# Patient Record
Sex: FEMALE | Race: WHITE
[De-identification: names, ages, dates, MRNs, and addresses within clinical notes are randomized per-mention and may not be internally consistent; named-entity substitution may affect disease eponyms.]

---

## 2024-02-22 ENCOUNTER — APPOINTMENT (OUTPATIENT)
Dept: ORTHOPEDIC SURGERY | Facility: CLINIC | Age: 64
End: 2024-02-22
Payer: OTHER MISCELLANEOUS

## 2024-02-22 VITALS — BODY MASS INDEX: 26.68 KG/M2 | WEIGHT: 170 LBS | HEIGHT: 67 IN

## 2024-02-22 DIAGNOSIS — Z78.9 OTHER SPECIFIED HEALTH STATUS: ICD-10-CM

## 2024-02-22 DIAGNOSIS — S70.01XA CONTUSION OF RIGHT HIP, INITIAL ENCOUNTER: ICD-10-CM

## 2024-02-22 DIAGNOSIS — M81.0 AGE-RELATED OSTEOPOROSIS W/OUT CURRENT PATHOLOGICAL FRACTURE: ICD-10-CM

## 2024-02-22 PROBLEM — Z00.00 ENCOUNTER FOR PREVENTIVE HEALTH EXAMINATION: Status: ACTIVE | Noted: 2024-02-22

## 2024-02-22 PROCEDURE — 99203 OFFICE O/P NEW LOW 30 MIN: CPT

## 2024-02-22 PROCEDURE — 73503 X-RAY EXAM HIP UNI 4/> VIEWS: CPT | Mod: RT

## 2024-02-22 NOTE — PHYSICAL EXAM
[2+] : dorsalis pedis pulse: 2+ [] : light touch intact throughout [Right] : right hip with pelvis [Moderate arthritis (Tonnis Grade 2)] : Moderate arthritis (Tonnis Grade 2)

## 2024-02-22 NOTE — HISTORY OF PRESENT ILLNESS
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 States she fell onto her left side. She has tried ice, heat, Advil, Mobic, and Tylenol with little relief. States she had a rash from the Mobic. Notes pain is worse during the day, but can bother her with sleeping at night.  [] : no [FreeTextEntry1] : RT Hip [FreeTextEntry5] : Trip and fall at work

## 2024-02-22 NOTE — ASSESSMENT
[FreeTextEntry1] : 63F p/w R hip contusion, troch bursitis, OA  f/u MRI R hip, r/o occult fx Continue NSAIDs as needed for pain return after imaging  The patient was advised of the diagnosis.  The natural history of the pathology was explained in full to the patient in layman's terms. All questions were answered.  The risks and benefits of surgical and non-surgical treatment alternatives were explained in full to the patient.

## 2024-02-26 ENCOUNTER — APPOINTMENT (OUTPATIENT)
Dept: MRI IMAGING | Facility: CLINIC | Age: 64
End: 2024-02-26
Payer: OTHER MISCELLANEOUS

## 2024-02-26 PROCEDURE — 73721 MRI JNT OF LWR EXTRE W/O DYE: CPT | Mod: LT

## 2024-03-07 ENCOUNTER — APPOINTMENT (OUTPATIENT)
Dept: ORTHOPEDIC SURGERY | Facility: CLINIC | Age: 64
End: 2024-03-07
Payer: OTHER MISCELLANEOUS

## 2024-03-07 VITALS — BODY MASS INDEX: 26.68 KG/M2 | WEIGHT: 170 LBS | HEIGHT: 67 IN

## 2024-03-07 PROCEDURE — 99214 OFFICE O/P EST MOD 30 MIN: CPT

## 2024-03-07 NOTE — HISTORY OF PRESENT ILLNESS
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 States she fell onto her left side. She has tried ice, heat, Advil, Mobic, and Tylenol with little relief. States she had a rash from the Mobic. Notes pain is worse during the day, but can bother her with sleeping at night.   3/7/24 f/u R hip MRI, symptoms unchanged [de-identified] : no treatment at this time

## 2024-03-07 NOTE — REVIEW OF SYSTEMS
[Joint Pain] : joint pain [Joint Stiffness] : joint stiffness [FreeTextEntry9] : rt hip/buttock/groin

## 2024-03-07 NOTE — PHYSICAL EXAM
[2+] : dorsalis pedis pulse: 2+ [Right] : right hip with pelvis [] : light touch intact throughout [Moderate arthritis (Tonnis Grade 2)] : Moderate arthritis (Tonnis Grade 2)

## 2024-03-07 NOTE — ASSESSMENT
[FreeTextEntry1] : 63F p/w R hamstring and glut tear, OA  MRI reviewed, hamstring and gluteal tear, OA Continue NSAIDs as needed for pain return 6 wks  The patient was advised of the diagnosis.  The natural history of the pathology was explained in full to the patient in layman's terms. All questions were answered.  The risks and benefits of surgical and non-surgical treatment alternatives were explained in full to the patient.

## 2024-03-07 NOTE — WORK
[Torn Ligament/Tendon/Muscle] : torn ligament, tendon or muscle [Consistent with my objective findings] : consistent with my objective findings [Are consistent with the injury] : are consistent with the injury [Total (100%)] : total (100%)

## 2024-05-09 ENCOUNTER — APPOINTMENT (OUTPATIENT)
Dept: ORTHOPEDIC SURGERY | Facility: CLINIC | Age: 64
End: 2024-05-09
Payer: OTHER MISCELLANEOUS

## 2024-05-09 VITALS — WEIGHT: 170 LBS | HEIGHT: 67 IN | BODY MASS INDEX: 26.68 KG/M2

## 2024-05-09 DIAGNOSIS — S76.311A STRAIN OF MUSCLE, FASCIA AND TENDON OF THE POSTERIOR MUSCLE GROUP AT THIGH LEVEL, RIGHT THIGH, INITIAL ENCOUNTER: ICD-10-CM

## 2024-05-09 DIAGNOSIS — M67.951 UNSPECIFIED DISORDER OF SYNOVIUM AND TENDON, RIGHT THIGH: ICD-10-CM

## 2024-05-09 DIAGNOSIS — M16.11 UNILATERAL PRIMARY OSTEOARTHRITIS, RIGHT HIP: ICD-10-CM

## 2024-05-09 PROCEDURE — 99214 OFFICE O/P EST MOD 30 MIN: CPT

## 2024-05-09 NOTE — DISCUSSION/SUMMARY
[de-identified] : The patient's current medication management of their orthopedic diagnosis was reviewed today:   (1) We discussed a comprehensive treatment plan that included possible pharmaceutical management involving the use of prescription strength medications including but not limited to options such as oral Naprosyn 500mg BID, once daily Meloxicam 15 mg, or 500-650 mg Tylenol versus over the counter oral medications and topical prescription NSAID Pennsaid vs over the counter Voltaren gel.   (2) There is a moderate risk of morbidity with further treatment, especially from use of prescription strength medications and possible side effects of these medications which include upset stomach with oral medications, skin reactions to topical medications and cardiac/renal issues with long term use.   (3) I recommended that the patient follow-up with their medical physician to discuss any significant specific potential issues with long term medication use such as interactions with current medications or with exacerbation of underlying medical comorbidities.   (4) The benefits and risks associated with use of injectable, oral or topical, prescription and over the counter anti-inflammatory medications were discussed with the patient. The patient voiced understanding of the risks including but not limited to bleeding, stroke, kidney dysfunction, heart disease, and were referred to the black box warning label for further information.  Prior to appointment and during encounter with patient extensive medical records were reviewed including but not limited to, hospital records, out patient records, imaging results, and lab data. During this appointment the patient was examined, diagnoses were discussed and explained in a face to face manner. In addition extensive time was spent reviewing aforementioned diagnostic studies. Counseling including abnormal image results, differential diagnoses, treatment options, risk and benefits, lifestyle changes, current condition, and current medications was performed. Patient's comments, questions, and concerns were address and patient verbalized understanding.

## 2024-05-09 NOTE — WORK
[Torn Ligament/Tendon/Muscle] : torn ligament, tendon or muscle [Are consistent with the injury] : are consistent with the injury [Consistent with my objective findings] : consistent with my objective findings [Total (100%)] : total (100%)

## 2024-05-09 NOTE — ASSESSMENT
[FreeTextEntry1] : 63F p/w R hamstring and glut tear, OA  Continue PT Continue NSAIDs for pain return 6-8 weeks   The patient was advised of the diagnosis. The natural history of the pathology was explained in full to the patient in layman's terms. All questions were answered. The risks and benefits of surgical and non-surgical treatment alternatives were explained in full to the patient.

## 2024-05-09 NOTE — HISTORY OF PRESENT ILLNESS
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 States she fell onto her left side. She has tried ice, heat, Advil, Mobic, and Tylenol with little relief. States she had a rash from the Mobic. Notes pain is worse during the day, but can bother her with sleeping at night.   3/7/24 f/u R hip MRI, symptoms unchanged 5/9/24: f/u R hip, feels like she is making some improvement recently but still having pain pretty frequently [de-identified] : p/t, hep

## 2024-07-11 ENCOUNTER — APPOINTMENT (OUTPATIENT)
Dept: ORTHOPEDIC SURGERY | Facility: CLINIC | Age: 64
End: 2024-07-11
Payer: OTHER MISCELLANEOUS

## 2024-07-11 VITALS — HEIGHT: 67 IN | WEIGHT: 170 LBS | BODY MASS INDEX: 26.68 KG/M2

## 2024-07-11 DIAGNOSIS — S76.311D STRAIN OF MUSCLE, FASCIA AND TENDON OF THE POSTERIOR MUSCLE GROUP AT THIGH LEVEL, RIGHT THIGH, SUBSEQUENT ENCOUNTER: ICD-10-CM

## 2024-07-11 DIAGNOSIS — M16.11 UNILATERAL PRIMARY OSTEOARTHRITIS, RIGHT HIP: ICD-10-CM

## 2024-07-11 DIAGNOSIS — M67.951 UNSPECIFIED DISORDER OF SYNOVIUM AND TENDON, RIGHT THIGH: ICD-10-CM

## 2024-07-11 PROCEDURE — 99214 OFFICE O/P EST MOD 30 MIN: CPT

## 2024-10-17 ENCOUNTER — APPOINTMENT (OUTPATIENT)
Dept: ORTHOPEDIC SURGERY | Facility: CLINIC | Age: 64
End: 2024-10-17
Payer: OTHER MISCELLANEOUS

## 2024-10-17 VITALS — WEIGHT: 170 LBS | HEIGHT: 67 IN | BODY MASS INDEX: 26.68 KG/M2

## 2024-10-17 DIAGNOSIS — M67.951 UNSPECIFIED DISORDER OF SYNOVIUM AND TENDON, RIGHT THIGH: ICD-10-CM

## 2024-10-17 DIAGNOSIS — S76.311A STRAIN OF MUSCLE, FASCIA AND TENDON OF THE POSTERIOR MUSCLE GROUP AT THIGH LEVEL, RIGHT THIGH, INITIAL ENCOUNTER: ICD-10-CM

## 2024-10-17 PROCEDURE — 99214 OFFICE O/P EST MOD 30 MIN: CPT
